# Patient Record
Sex: FEMALE | Race: WHITE | NOT HISPANIC OR LATINO | Employment: STUDENT | URBAN - METROPOLITAN AREA
[De-identification: names, ages, dates, MRNs, and addresses within clinical notes are randomized per-mention and may not be internally consistent; named-entity substitution may affect disease eponyms.]

---
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
## 2019-01-03 ENCOUNTER — OFFICE VISIT (OUTPATIENT)
Dept: DERMATOLOGY | Facility: CLINIC | Age: 19
End: 2019-01-03
Payer: COMMERCIAL

## 2019-01-03 DIAGNOSIS — L70.0 ACNE VULGARIS: Primary | ICD-10-CM

## 2019-01-03 PROCEDURE — 99999 PR PBB SHADOW E&M-EST. PATIENT-LVL II: ICD-10-PCS | Mod: PBBFAC,,, | Performed by: DERMATOLOGY

## 2019-01-03 PROCEDURE — 99202 OFFICE O/P NEW SF 15 MIN: CPT | Mod: S$GLB,,, | Performed by: DERMATOLOGY

## 2019-01-03 PROCEDURE — 99202 PR OFFICE/OUTPT VISIT, NEW, LEVL II, 15-29 MIN: ICD-10-PCS | Mod: S$GLB,,, | Performed by: DERMATOLOGY

## 2019-01-03 PROCEDURE — 99999 PR PBB SHADOW E&M-EST. PATIENT-LVL II: CPT | Mod: PBBFAC,,, | Performed by: DERMATOLOGY

## 2019-01-03 RX ORDER — ADAPALENE AND BENZOYL PEROXIDE 3; 25 MG/G; MG/G
GEL TOPICAL
Qty: 45 G | Refills: 3 | Status: SHIPPED | OUTPATIENT
Start: 2019-01-03 | End: 2020-02-18

## 2019-01-03 NOTE — PATIENT INSTRUCTIONS

## 2019-01-03 NOTE — PROGRESS NOTES
Subjective:       Patient ID:  Candace Brown is a 18 y.o. female who presents for   Chief Complaint   Patient presents with    Acne     face, chest, back, 8+ years, redness, Tx otc face wash     History of Present Illness: The patient presents with chief complaint of acne.  Location: face, chest, and back  Duration: 8 yrs.  Signs/Symptoms: redness, occ. painful cysts    Prior treatments: currently using Philosophy face wash bid, Neutrogena vit C moisturizer; had Rx sulfur-based wash around age 11          Review of Systems   HENT: Negative for nosebleeds and headaches.    Gastrointestinal: Negative for diarrhea and Sensitivity to oral antibiotics.   Genitourinary: Positive for irregular periods (mildly - not on ocp).   Musculoskeletal: Negative for arthralgias.   Skin: Positive for daily sunscreen use and activity-related sunscreen use. Negative for recent sunburn.        No hx of AD or sensitive skin   Neurological: Negative for headaches.   Psychiatric/Behavioral: Negative for depressed mood.   Hematologic/Lymphatic: Does not bruise/bleed easily.        Objective:    Physical Exam   Constitutional: She appears well-developed and well-nourished. No distress.   Neurological: She is alert and oriented to person, place, and time. She is not disoriented.   Psychiatric: She has a normal mood and affect.   Skin:   Areas Examined (abnormalities noted in diagram):   Head / Face Inspection Performed  Neck Inspection Performed  Chest / Axilla Inspection Performed  Back Inspection Performed  RUE Inspected  LUE Inspection Performed                   Diagram Legend     Erythematous scaling macule/papule c/w actinic keratosis       Vascular papule c/w angioma      Pigmented verrucoid papule/plaque c/w seborrheic keratosis      Yellow umbilicated papule c/w sebaceous hyperplasia      Irregularly shaped tan macule c/w lentigo     1-2 mm smooth white papules consistent with Milia      Movable subcutaneous cyst with punctum  c/w epidermal inclusion cyst      Subcutaneous movable cyst c/w pilar cyst      Firm pink to brown papule c/w dermatofibroma      Pedunculated fleshy papule(s) c/w skin tag(s)      Evenly pigmented macule c/w junctional nevus     Mildly variegated pigmented, slightly irregular-bordered macule c/w mildly atypical nevus      Flesh colored to evenly pigmented papule c/w intradermal nevus       Pink pearly papule/plaque c/w basal cell carcinoma      Erythematous hyperkeratotic cursted plaque c/w SCC      Surgical scar with no sign of skin cancer recurrence      Open and closed comedones      Inflammatory papules and pustules      Verrucoid papule consistent consistent with wart     Erythematous eczematous patches and plaques     Dystrophic onycholytic nail with subungual debris c/w onychomycosis     Umbilicated papule    Erythematous-base heme-crusted tan verrucoid plaque consistent with inflamed seborrheic keratosis     Erythematous Silvery Scaling Plaque c/w Psoriasis     See annotation      Assessment / Plan:        Acne vulgaris  -     EPIDUO FORTE 0.3-2.5 % GlwP; AAA face q other night - qhs as tolerated.  Dispense: 45 g; Refill: 3 - azeb    Recommend OTC Benzoyl Peroxide wash for chest and back daily.  Continue to wash face bid with non-medicated cleanser.  Pt will f/u with GYN to initiate OCP.         Follow-up if symptoms worsen or fail to improve, for with Derm Physician's Assistant (CVC).

## 2019-01-03 NOTE — LETTER
January 3, 2019      [facility]  [address] [address]           [facility] - Dermatology  [address]  [facility] [address]  Phone: [phone]  Fax: [phone]          Patient: Candace Brown   MR Number: [id number]   YOB: 2000   Date of Visit: 1/3/2019       Dear [facility]:    Thank you for referring Candace Brown to me for evaluation. Attached you will find relevant portions of my assessment and plan of care.    If you have questions, please do not hesitate to call me. I look forward to following Candace Brown along with you.    Sincerely,    Areli Estrada MD    Enclosure  CC:  No Recipients    If you would like to receive this communication electronically, please contact [email or web address] or [phone] to request more information on [employer] access.    For providers and/or their staff who would like to refer a patient to [facility], please contact us through our one-stop-shop provider referral line, Mariaa Galdamez, at [phone].    If you feel you have received this communication in error or would no longer like to receive these types of communications, please e-mail [email or web address]

## 2020-02-17 ENCOUNTER — OFFICE VISIT (OUTPATIENT)
Dept: INTERNAL MEDICINE | Facility: CLINIC | Age: 20
End: 2020-02-17
Payer: COMMERCIAL

## 2020-02-17 VITALS
DIASTOLIC BLOOD PRESSURE: 72 MMHG | HEIGHT: 67 IN | SYSTOLIC BLOOD PRESSURE: 126 MMHG | WEIGHT: 176.13 LBS | OXYGEN SATURATION: 99 % | BODY MASS INDEX: 27.64 KG/M2 | HEART RATE: 91 BPM

## 2020-02-17 DIAGNOSIS — Z00.00 ENCOUNTER FOR WELLNESS EXAMINATION: Primary | ICD-10-CM

## 2020-02-17 PROCEDURE — 99395 PREV VISIT EST AGE 18-39: CPT | Mod: S$GLB,,, | Performed by: STUDENT IN AN ORGANIZED HEALTH CARE EDUCATION/TRAINING PROGRAM

## 2020-02-17 PROCEDURE — 99999 PR PBB SHADOW E&M-EST. PATIENT-LVL III: CPT | Mod: PBBFAC,,, | Performed by: STUDENT IN AN ORGANIZED HEALTH CARE EDUCATION/TRAINING PROGRAM

## 2020-02-17 PROCEDURE — 99999 PR PBB SHADOW E&M-EST. PATIENT-LVL III: ICD-10-PCS | Mod: PBBFAC,,, | Performed by: STUDENT IN AN ORGANIZED HEALTH CARE EDUCATION/TRAINING PROGRAM

## 2020-02-17 PROCEDURE — 99395 PR PREVENTIVE VISIT,EST,18-39: ICD-10-PCS | Mod: S$GLB,,, | Performed by: STUDENT IN AN ORGANIZED HEALTH CARE EDUCATION/TRAINING PROGRAM

## 2020-02-17 NOTE — PROGRESS NOTES
Clinic Note  02/17/2020      Subjective:       Patient ID: Candace Brown is a 19 y.o. female being seen for a Return to School Letter  Chief Complaint: Letter for School/Work      Patient is an otherwise healthy 19 y.o. F. She is presenting to clinic today for Return to School Letter. Patient reports that she recently returned from [place] on 02/04/20 and has been quarantined at home where she was asymptomatic for the duration of her sequestration. She was living in [place] and visited [facility], [place], [facility], and [facility] while in [place]. Patient denies any travel to [facility]. She denies coming into contact with any patient with flu-like symptoms. She reports compliance with wearing a surgical mask after the outbreak in [place] and throughout the duration of her flight home. She denies any visits to markets with live animals and denies eating exotic animals such as bats. She denies any fevers, chills, congestion, cough, SOB, nausea, vomiting, diarrhea, or muscle aches.      Review of Systems   Constitutional: Negative for activity change, chills, fever and unexpected weight change.   HENT: Negative for ear pain, hearing loss, rhinorrhea, sore throat and trouble swallowing.    Eyes: Negative for photophobia, discharge and visual disturbance.   Respiratory: Negative for chest tightness, shortness of breath and wheezing.    Cardiovascular: Negative for chest pain and palpitations.   Gastrointestinal: Negative for blood in stool, constipation, diarrhea, nausea and vomiting.   Endocrine: Negative for polydipsia and polyuria.   Genitourinary: Negative for difficulty urinating, dysuria, hematuria, menstrual problem and urgency.   Musculoskeletal: Negative for arthralgias, back pain, joint swelling, myalgias and neck pain.   Skin: Negative for rash and wound.   Neurological: Positive for headaches. Negative for dizziness, syncope, weakness and light-headedness. Numbness: x1 day.   Psychiatric/Behavioral: Negative for  "agitation, behavioral problems, confusion and dysphoric mood.       Patient's Medications   New Prescriptions    No medications on file   Previous Medications    No medications on file   Modified Medications    No medications on file   Discontinued Medications    EPIDUO FORTE 0.3-2.5 % GLWP    AAA face q other night - qhs as tolerated.       Patient Active Problem List    Diagnosis Date Noted    Panic attacks     Anxiety attack 08/22/2016           Objective:      /72 (BP Location: Right arm, Patient Position: Sitting, BP Method: Large (Manual))   Pulse 91   Ht 5' 7" (1.702 m)   Wt 79.9 kg (176 lb 2.4 oz)   SpO2 99%   BMI 27.59 kg/m²   Estimated body mass index is 27.59 kg/m² as calculated from the following:    Height as of this encounter: 5' 7" (1.702 m).    Weight as of this encounter: 79.9 kg (176 lb 2.4 oz).    Physical Exam   Constitutional: She is oriented to person, place, and time. She appears well-developed and well-nourished.   HENT:   Head: Normocephalic and atraumatic.   Mouth/Throat: No oropharyngeal exudate.   Eyes: Conjunctivae and EOM are normal. No scleral icterus.   Neck: Normal range of motion. Neck supple.   Cardiovascular: Normal rate, regular rhythm, normal heart sounds and intact distal pulses.   Pulmonary/Chest: Effort normal and breath sounds normal.   Abdominal: Soft. Bowel sounds are normal. There is no tenderness.   Musculoskeletal: Normal range of motion.   Neurological: She is alert and oriented to person, place, and time.   Skin: Skin is warm and dry.   Psychiatric: She has a normal mood and affect. Her behavior is normal. Judgment and thought content normal.   Vitals reviewed.      Assessment and Plan:   Candace ROMAN was seen today for letter for school/work.    Diagnoses and all orders for this visit:    Encounter for wellness examination    There is no indication for further testing for Coronavirus as she has remained asymptomatic for two weeks per CDC guidelines. " Patient may return to school.     Patient seen and plan of care discussed with Dr. Dario Nash MD

## 2020-02-17 NOTE — LETTER
February 17, 2020      [facility]  [address]  [facility] [address]  Phone: [phone]  Fax: [phone]       Patient: Candace Brown   YOB: 2000  Date of Visit: 02/17/2020    To Whom It May Concern:    Jarvis Brown  was at [facility] on 02/17/2020. She may return to work/school on 02/18/2020 with no restrictions. She returned from [place] on 02/03/2020 and remained quarantined until 02/17/2020. She was asymptomatic throughout that duration. There is no indication for further testing for Coronavirus as she has remained asymptomatic for two weeks per CDC guidelines. If you have any questions or concerns, or if I can be of further assistance, please do not hesitate to contact me.    Sincerely,    Raven Nash MD/MS  [facility]   Internal Medicine

## 2020-02-18 NOTE — PROGRESS NOTES
I have reviewed and concur with Dr. Nash's history, physical, assessment, and plan.  I have personally interviewed and examined the patient at bedside.      Candace Brown is a 19 y.o. lady with no medical history who presents to [facility] for a return to school letter.  She has been in [place] for approximately 6 months where she was studying abroad.  She had spent most of her time in [place], but has also been in [facility] and [place].  During notification of the COVID-19 outbreak, she had noted that she had continued to wear a face mask.  Additionally, she denies coming into contact with anyone with flu-like symptoms.  She flew back around 2/3, where she left [place], landed in [place] [facility], and then into the  around 2/3 to 2/4.  During this time, she had remained indoors for self quarantine.  She has remained afebrile and has not developed any upper respiratory symptoms.  Given this information and per CDC recommendations, she does not qualify as a Person of Interest given lack of self reported clinical findings.  Additionally, she has completed 14 days of self quarantine, which the CDC recommends as the incubation period.  Given she has not developed symptoms during this time, it would be safe to assume per CDC recommendations that she would not be deemed transmissible.  She would thus be safe to return to school with the given information at this point.    Dale Bishop M.D.  Assistant Staff- Hospital Medicine  Chief Medical Resident - Internal Medicine   [phone]

## 2020-03-31 ENCOUNTER — TELEPHONE (OUTPATIENT)
Dept: DERMATOLOGY | Facility: CLINIC | Age: 20
End: 2020-03-31

## 2020-03-31 NOTE — TELEPHONE ENCOUNTER
----- Message from Ariella Ratliff sent at 3/31/2020  4:08 PM CDT -----  My [facility] Portal Appointment Request From: Candace Brown    With Provider: Any provider    Preferred Date Range: 4/1/2020 - 4/13/2020    Preferred Times: Any time    Reason for visit: Hives/ Red blotches over stomach and pelvic region    Comments:  Red blotches spreading over skin, moved from stomach down to pelvic region and up around breasts

## 2020-03-31 NOTE — TELEPHONE ENCOUNTER
Spoke with patient.  She reports that she went to urgent care and no longer needs an appointment with Derm.

## 2020-10-14 ENCOUNTER — OFFICE VISIT (OUTPATIENT)
Dept: ALLERGY | Facility: CLINIC | Age: 20
End: 2020-10-14
Payer: COMMERCIAL

## 2020-10-14 ENCOUNTER — LAB VISIT (OUTPATIENT)
Dept: LAB | Facility: HOSPITAL | Age: 20
End: 2020-10-14
Attending: ALLERGY & IMMUNOLOGY
Payer: COMMERCIAL

## 2020-10-14 VITALS
TEMPERATURE: 98 F | BODY MASS INDEX: 27.53 KG/M2 | DIASTOLIC BLOOD PRESSURE: 82 MMHG | WEIGHT: 171.31 LBS | HEART RATE: 89 BPM | SYSTOLIC BLOOD PRESSURE: 117 MMHG | HEIGHT: 66 IN

## 2020-10-14 DIAGNOSIS — Z91.018 FOOD ALLERGY: ICD-10-CM

## 2020-10-14 DIAGNOSIS — L50.9 URTICARIA: Primary | ICD-10-CM

## 2020-10-14 DIAGNOSIS — L50.9 URTICARIA: ICD-10-CM

## 2020-10-14 LAB
ALBUMIN SERPL BCP-MCNC: 4.9 G/DL (ref 3.5–5.2)
ALP SERPL-CCNC: 50 U/L (ref 55–135)
ALT SERPL W/O P-5'-P-CCNC: 12 U/L (ref 10–44)
ANION GAP SERPL CALC-SCNC: 11 MMOL/L (ref 8–16)
AST SERPL-CCNC: 14 U/L (ref 10–40)
BASOPHILS # BLD AUTO: 0.03 K/UL (ref 0–0.2)
BASOPHILS NFR BLD: 0.4 % (ref 0–1.9)
BILIRUB SERPL-MCNC: 0.6 MG/DL (ref 0.1–1)
BUN SERPL-MCNC: 7 MG/DL (ref 6–20)
CALCIUM SERPL-MCNC: 10.2 MG/DL (ref 8.7–10.5)
CHLORIDE SERPL-SCNC: 102 MMOL/L (ref 95–110)
CO2 SERPL-SCNC: 28 MMOL/L (ref 23–29)
CREAT SERPL-MCNC: 0.8 MG/DL (ref 0.5–1.4)
DIFFERENTIAL METHOD: ABNORMAL
EOSINOPHIL # BLD AUTO: 0.1 K/UL (ref 0–0.5)
EOSINOPHIL NFR BLD: 0.7 % (ref 0–8)
ERYTHROCYTE [DISTWIDTH] IN BLOOD BY AUTOMATED COUNT: 13.2 % (ref 11.5–14.5)
EST. GFR  (AFRICAN AMERICAN): >60 ML/MIN/1.73 M^2
EST. GFR  (NON AFRICAN AMERICAN): >60 ML/MIN/1.73 M^2
GLUCOSE SERPL-MCNC: 76 MG/DL (ref 70–110)
HCT VFR BLD AUTO: 45.7 % (ref 37–48.5)
HGB BLD-MCNC: 14.4 G/DL (ref 12–16)
IMM GRANULOCYTES # BLD AUTO: 0.01 K/UL (ref 0–0.04)
IMM GRANULOCYTES NFR BLD AUTO: 0.1 % (ref 0–0.5)
LYMPHOCYTES # BLD AUTO: 1.6 K/UL (ref 1–4.8)
LYMPHOCYTES NFR BLD: 24 % (ref 18–48)
MCH RBC QN AUTO: 29 PG (ref 27–31)
MCHC RBC AUTO-ENTMCNC: 31.5 G/DL (ref 32–36)
MCV RBC AUTO: 92 FL (ref 82–98)
MONOCYTES # BLD AUTO: 0.4 K/UL (ref 0.3–1)
MONOCYTES NFR BLD: 5.6 % (ref 4–15)
NEUTROPHILS # BLD AUTO: 4.7 K/UL (ref 1.8–7.7)
NEUTROPHILS NFR BLD: 69.2 % (ref 38–73)
NRBC BLD-RTO: 0 /100 WBC
PLATELET # BLD AUTO: 252 K/UL (ref 150–350)
PMV BLD AUTO: 10.4 FL (ref 9.2–12.9)
POTASSIUM SERPL-SCNC: 3.9 MMOL/L (ref 3.5–5.1)
PROT SERPL-MCNC: 7.5 G/DL (ref 6–8.4)
RBC # BLD AUTO: 4.96 M/UL (ref 4–5.4)
SODIUM SERPL-SCNC: 141 MMOL/L (ref 136–145)
WBC # BLD AUTO: 6.75 K/UL (ref 3.9–12.7)

## 2020-10-14 PROCEDURE — 84165 PATHOLOGIST INTERPRETATION SPE: ICD-10-PCS | Mod: 26,,, | Performed by: PATHOLOGY

## 2020-10-14 PROCEDURE — 36415 COLL VENOUS BLD VENIPUNCTURE: CPT

## 2020-10-14 PROCEDURE — 85025 COMPLETE CBC W/AUTO DIFF WBC: CPT

## 2020-10-14 PROCEDURE — 84443 ASSAY THYROID STIM HORMONE: CPT

## 2020-10-14 PROCEDURE — 99999 PR PBB SHADOW E&M-EST. PATIENT-LVL III: CPT | Mod: PBBFAC,,, | Performed by: ALLERGY & IMMUNOLOGY

## 2020-10-14 PROCEDURE — 86003 ALLG SPEC IGE CRUDE XTRC EA: CPT | Mod: 59

## 2020-10-14 PROCEDURE — 86705 HEP B CORE ANTIBODY IGM: CPT

## 2020-10-14 PROCEDURE — 99999 PR PBB SHADOW E&M-EST. PATIENT-LVL III: ICD-10-PCS | Mod: PBBFAC,,, | Performed by: ALLERGY & IMMUNOLOGY

## 2020-10-14 PROCEDURE — 83520 IMMUNOASSAY QUANT NOS NONAB: CPT

## 2020-10-14 PROCEDURE — 82785 ASSAY OF IGE: CPT

## 2020-10-14 PROCEDURE — 86038 ANTINUCLEAR ANTIBODIES: CPT

## 2020-10-14 PROCEDURE — 86003 ALLG SPEC IGE CRUDE XTRC EA: CPT

## 2020-10-14 PROCEDURE — 3008F BODY MASS INDEX DOCD: CPT | Mod: CPTII,S$GLB,, | Performed by: ALLERGY & IMMUNOLOGY

## 2020-10-14 PROCEDURE — 99204 PR OFFICE/OUTPT VISIT, NEW, LEVL IV, 45-59 MIN: ICD-10-PCS | Mod: S$GLB,,, | Performed by: ALLERGY & IMMUNOLOGY

## 2020-10-14 PROCEDURE — 3008F PR BODY MASS INDEX (BMI) DOCUMENTED: ICD-10-PCS | Mod: CPTII,S$GLB,, | Performed by: ALLERGY & IMMUNOLOGY

## 2020-10-14 PROCEDURE — 86592 SYPHILIS TEST NON-TREP QUAL: CPT

## 2020-10-14 PROCEDURE — 84165 PROTEIN E-PHORESIS SERUM: CPT | Mod: 26,,, | Performed by: PATHOLOGY

## 2020-10-14 PROCEDURE — 84165 PROTEIN E-PHORESIS SERUM: CPT

## 2020-10-14 PROCEDURE — 86803 HEPATITIS C AB TEST: CPT

## 2020-10-14 PROCEDURE — 86703 HIV-1/HIV-2 1 RESULT ANTBDY: CPT

## 2020-10-14 PROCEDURE — 80053 COMPREHEN METABOLIC PANEL: CPT

## 2020-10-14 PROCEDURE — 99204 OFFICE O/P NEW MOD 45 MIN: CPT | Mod: S$GLB,,, | Performed by: ALLERGY & IMMUNOLOGY

## 2020-10-14 RX ORDER — EPINEPHRINE 0.3 MG/.3ML
1 INJECTION SUBCUTANEOUS ONCE
Qty: 2 DEVICE | Refills: 3 | Status: SHIPPED | OUTPATIENT
Start: 2020-10-14 | End: 2020-10-14

## 2020-10-14 NOTE — PATIENT INSTRUCTIONS
Epinephrine injection (Auto-injector)  What is this medicine?  EPINEPHRINE (ep i ROSALBA rin) is used for the emergency treatment of severe allergic reactions. You should keep this medicine with you at all times.  How should I use this medicine?  This medicine is for injection into the outer thigh. Your doctor or health care professional will instruct you on the proper use of the device during an emergency. Read all directions carefully and make sure you understand them. Do not use more often than directed.  Talk to your pediatrician regarding the use of this medicine in children. Special care may be needed. This drug is commonly used in children. A special device is available for use in children. If you are giving this medicine to a young child, hold their leg firmly in place before and during the injection to prevent injury.  What side effects may I notice from receiving this medicine?  Side effects that you should report to your doctor or health care professional as soon as possible:  · allergic reactions like skin rash, itching or hives, swelling of the face, lips, or tongue  · breathing problems  · chest pain  · fast, irregular heartbeat  · pain, tingling, numbness in the hands or feet  · pain, redness, or irritation at site where injected  · vomiting  Side effects that usually do not require medical attention (report to your doctor or health care professional if they continue or are bothersome):  · anxious  · dizziness  · dry mouth  · headache  · increased sweating  · nausea  · unusually weak or tired  What may interact with this medicine?  This medicine is only used during an emergency. Significant drug interactions are not likely during emergency use.  What if I miss a dose?  This does not apply. You should only use this medicine for an allergic reaction.  Where should I keep my medicine?  Keep out of the reach of children.  Store at room temperature between 15 and 30 degrees C (59 and 86 degrees F). Protect  from light and heat. The solution should be clear in color. If the solution is discolored or contains particles it must be replaced. Throw away any unused medicine after the expiration date. Ask your doctor or pharmacist about proper disposal of the injector if it is  or has been used. Always replace your auto-injector before it expires.  What should I tell my health care provider before I take this medicine?  They need to know if you have any of the following conditions:  · diabetes  · heart disease  · high blood pressure  · lung or breathing disease, like asthma  · Parkinson's disease  · thyroid disease  · an unusual or allergic reaction to epinephrine, sulfites, other medicines, foods, dyes, or preservatives  · pregnant or trying to get pregnant  · breast-feeding  What should I watch for while using this medicine?  Keep this medicine ready for use in the case of a severe allergic reaction. Make sure that you have the phone number of your doctor or health care professional and local hospital ready. Remember to check the expiration date of your medicine regularly. You may need to have additional units of this medicine with you at work, school, or other places. Talk to your doctor or health care professional about your need for extra units. Some emergencies may require an additional dose. Check with your doctor or a health care professional before using an extra dose.  After use, go to the nearest hospital or call 911. Avoid physical activity. Make sure the treating health care professional knows you have received an injection of this medicine. You will receive additional instructions on what to do during and after use of this medicine before a medical emergency occurs.  NOTE:This sheet is a summary. It may not cover all possible information. If you have questions about this medicine, talk to your doctor, pharmacist, or health care provider. Copyright© 2017 [employer]

## 2020-10-14 NOTE — PROGRESS NOTES
Subjective:       Patient ID: Candace Brown is a 20 y.o. female.    Initial Visit    Chief Complaint:  Urticaria      HPI: 20 year old female complaining of hives for four days prior to starting medication that alleviated them.  She reports having hives April 2019- 5 days. The second episode of hives March 2020 and they lasted 8 days. She denies an association between her hives and the following: food, contact, water, pressure, change in internal temperature, the sun or exercise. 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  Lesions do not scar  Lesions do not last in one area longer than 24 hours  Lesions itch  She treated symptoms with methylprednisolone and hydroxyzine.  Triggers: unknown    Walnuts- tongue itchy that does not progress. She eats in spite of symptoms.  No reaction to almonds  Pecan- tongue itchy and she avoids  She denies tongue or throat swelling.  Peanut- no allergy      Past Medical History:   Diagnosis Date    Abnormal number of teeth     3 rows of teeth s/p surgical removal    Anxiety     panic disorder     Family History   Problem Relation Age of Onset    Hypothyroidism Mother     Diabetes Father     Hyperlipidemia Father     Asthma Maternal Grandmother     Hypertension Maternal Grandmother     Venous thrombosis Maternal Grandmother     Melanoma Neg Hx      No current outpatient medications on file prior to visit.     No current facility-administered medications on file prior to visit.      Review of patient's allergies indicates:   Allergen Reactions    Nuts [tree nut]      Walnuts-tongue itching       Environmental History: No pets. [employer] student  Review of Systems   Constitutional: Negative for chills and fever.   HENT: Negative for congestion and rhinorrhea.    Eyes: Negative for discharge and itching.   Respiratory: Negative for chest tightness, shortness of breath and wheezing.    Cardiovascular: Negative for chest pain and leg swelling.   Gastrointestinal: Negative  for nausea and vomiting.   Skin: Positive for rash. Negative for wound.   Allergic/Immunologic: Negative for environmental allergies and food allergies.   Hematological: Negative for adenopathy. Does not bruise/bleed easily.   Psychiatric/Behavioral: Negative for behavioral problems and suicidal ideas.        Objective:    Physical Exam  Vitals signs reviewed.   Constitutional:       General: She is not in acute distress.     Appearance: Normal appearance. She is well-developed. She is not ill-appearing, toxic-appearing or diaphoretic.   HENT:      Head: Normocephalic and atraumatic.      Right Ear: Tympanic membrane, ear canal and external ear normal. There is no impacted cerumen.      Left Ear: Tympanic membrane, ear canal and external ear normal. There is no impacted cerumen.      Nose: Nose normal. No congestion or rhinorrhea.      Mouth/Throat:      Mouth: Mucous membranes are moist.      Pharynx: Oropharynx is clear. No oropharyngeal exudate or posterior oropharyngeal erythema.   Eyes:      General: No scleral icterus.        Right eye: No discharge.         Left eye: No discharge.      Pupils: Pupils are equal, round, and reactive to light.   Neck:      Musculoskeletal: Normal range of motion and neck supple. No neck rigidity or muscular tenderness.      Thyroid: No thyromegaly.   Cardiovascular:      Rate and Rhythm: Normal rate and regular rhythm.      Heart sounds: Normal heart sounds. No murmur. No friction rub. No gallop.    Pulmonary:      Effort: Pulmonary effort is normal. No respiratory distress.      Breath sounds: Normal breath sounds. No stridor. No wheezing, rhonchi or rales.   Chest:      Chest wall: No tenderness.   Abdominal:      General: Bowel sounds are normal. There is no distension.      Palpations: Abdomen is soft. There is no mass.      Tenderness: There is no abdominal tenderness. There is no guarding or rebound.      Hernia: No hernia is present.   Musculoskeletal: Normal range of  motion.         General: No swelling, tenderness, deformity or signs of injury.      Right lower leg: No edema.      Left lower leg: No edema.   Lymphadenopathy:      Cervical: No cervical adenopathy.   Skin:     General: Skin is warm.      Coloration: Skin is not pale.      Findings: No erythema or rash.   Neurological:      General: No focal deficit present.      Mental Status: She is alert and oriented to person, place, and time.      Gait: Gait normal.   Psychiatric:         Mood and Affect: Mood normal.         Behavior: Behavior normal.         Thought Content: Thought content normal.         Judgment: Judgment normal.                 Assessment:       1. Urticaria    2. Food allergy         Plan:       Urticaria  -     CU (Chronic Urticaria) Index Panel; Future; Expected date: 10/14/2020  -     SHAWN Screen w/Reflex; Future; Expected date: 10/14/2020  -     CBC auto differential; Future; Expected date: 10/14/2020  -     Protein Electrophoresis, Serum; Future; Expected date: 10/14/2020  -     Stool Exam-Ova,Cysts,Parasites; Future; Expected date: 10/14/2020  -     Ascaris IgE; Future; Expected date: 10/14/2020  -     RPR; Future; Expected date: 10/14/2020  -     Comprehensive Metabolic Panel; Future; Expected date: 10/14/2020  -     HIV 1/2 Ag/Ab (4th Gen); Future; Expected date: 10/14/2020  -     Hepatitis C Antibody; Future; Expected date: 10/14/2020  -     Hepatitis B Core Antibody, IgM; Future; Expected date: 10/14/2020  -     IgE; Future; Expected date: 10/14/2020  -     Tryptase; Future; Expected date: 10/14/2020    Food allergy  -     Hamilton IgE; Future; Expected date: 10/14/2020  -     Pecan, nut; Future; Expected date: 10/14/2020    Avoid walnuts and pecans.  Epipen 2 pack  Discussed use, care and administration.  RTC 3 weeks or sooner, if needed.    BARI AMES

## 2020-10-15 ENCOUNTER — PATIENT MESSAGE (OUTPATIENT)
Dept: ALLERGY | Facility: CLINIC | Age: 20
End: 2020-10-15

## 2020-10-15 LAB
ALBUMIN SERPL ELPH-MCNC: 4.68 G/DL (ref 3.35–5.55)
ALPHA1 GLOB SERPL ELPH-MCNC: 0.23 G/DL (ref 0.17–0.41)
ALPHA2 GLOB SERPL ELPH-MCNC: 0.65 G/DL (ref 0.43–0.99)
ANA SER QL IF: NORMAL
B-GLOBULIN SERPL ELPH-MCNC: 0.67 G/DL (ref 0.5–1.1)
GAMMA GLOB SERPL ELPH-MCNC: 0.87 G/DL (ref 0.67–1.58)
HBV CORE IGM SERPL QL IA: NEGATIVE
HCV AB SERPL QL IA: NEGATIVE
HIV 1+2 AB+HIV1 P24 AG SERPL QL IA: NEGATIVE
IGE SERPL-ACNC: <35 IU/ML (ref 0–100)
PATHOLOGIST INTERPRETATION SPE: NORMAL
PROT SERPL-MCNC: 7.1 G/DL (ref 6–8.4)
RPR SER QL: NORMAL

## 2020-10-16 LAB — TRYPTASE LEVEL: 3.3 NG/ML

## 2020-10-19 LAB
DEPRECATED PECAN/HICK NUT IGE RAST QL: NORMAL
DEPRECATED WALNUT IGE RAST QL: NORMAL
PECAN/HICK NUT IGE QN: <0.1 KU/L
WALNUT IGE QN: <0.1 KU/L

## 2020-10-20 LAB
CU INDEX: 2.6
CU, ANTI-THYROGLOBULIN IGG: <20 IU/ML
CU, ANTI-THYROID PEROXIDASE IGG: <10 IU/ML
CU, TSH (THYROTROPIN): 2.1 UIU/ML (ref 0.4–4)

## 2020-10-21 LAB
A LUMBRIC IGE QN: <0.1 KU/L
DEPRECATED A LUMBRIC IGE RAST QL: 0

## 2020-10-23 ENCOUNTER — TELEPHONE (OUTPATIENT)
Dept: ALLERGY | Facility: CLINIC | Age: 20
End: 2020-10-23

## 2020-10-23 NOTE — PROGRESS NOTES
Left message for pt informing her that lab results will be sent to her portal and to call back if she has any questions.

## 2020-10-23 NOTE — TELEPHONE ENCOUNTER
----- Message from Teresa Alex sent at 10/23/2020 10:32 AM CDT -----  Contact: [phone]  Type:  Patient Returning Call    Who Called: patient   Who Left Message for Patient: nurse   Does the patient know what this is regarding?: results   Would the patient rather a call back or a response via [facility]?  Call back   Best Call Back Number [phone]  Additional Information:

## 2020-10-27 ENCOUNTER — PATIENT MESSAGE (OUTPATIENT)
Dept: ALLERGY | Facility: CLINIC | Age: 20
End: 2020-10-27

## 2020-11-04 ENCOUNTER — OFFICE VISIT (OUTPATIENT)
Dept: ALLERGY | Facility: CLINIC | Age: 20
End: 2020-11-04
Payer: COMMERCIAL

## 2020-11-04 VITALS
DIASTOLIC BLOOD PRESSURE: 82 MMHG | BODY MASS INDEX: 27.6 KG/M2 | TEMPERATURE: 98 F | HEIGHT: 66 IN | WEIGHT: 171.75 LBS | HEART RATE: 86 BPM | SYSTOLIC BLOOD PRESSURE: 127 MMHG

## 2020-11-04 DIAGNOSIS — K90.49 FOOD INTOLERANCE: ICD-10-CM

## 2020-11-04 DIAGNOSIS — L50.9 URTICARIA: Primary | ICD-10-CM

## 2020-11-04 PROCEDURE — 3008F BODY MASS INDEX DOCD: CPT | Mod: CPTII,S$GLB,, | Performed by: ALLERGY & IMMUNOLOGY

## 2020-11-04 PROCEDURE — 99214 PR OFFICE/OUTPT VISIT, EST, LEVL IV, 30-39 MIN: ICD-10-PCS | Mod: S$GLB,,, | Performed by: ALLERGY & IMMUNOLOGY

## 2020-11-04 PROCEDURE — 3008F PR BODY MASS INDEX (BMI) DOCUMENTED: ICD-10-PCS | Mod: CPTII,S$GLB,, | Performed by: ALLERGY & IMMUNOLOGY

## 2020-11-04 PROCEDURE — 99999 PR PBB SHADOW E&M-EST. PATIENT-LVL III: ICD-10-PCS | Mod: PBBFAC,,, | Performed by: ALLERGY & IMMUNOLOGY

## 2020-11-04 PROCEDURE — 1126F AMNT PAIN NOTED NONE PRSNT: CPT | Mod: S$GLB,,, | Performed by: ALLERGY & IMMUNOLOGY

## 2020-11-04 PROCEDURE — 99214 OFFICE O/P EST MOD 30 MIN: CPT | Mod: S$GLB,,, | Performed by: ALLERGY & IMMUNOLOGY

## 2020-11-04 PROCEDURE — 99999 PR PBB SHADOW E&M-EST. PATIENT-LVL III: CPT | Mod: PBBFAC,,, | Performed by: ALLERGY & IMMUNOLOGY

## 2020-11-04 PROCEDURE — 1126F PR PAIN SEVERITY QUANTIFIED, NO PAIN PRESENT: ICD-10-PCS | Mod: S$GLB,,, | Performed by: ALLERGY & IMMUNOLOGY

## 2020-11-04 NOTE — PROGRESS NOTES
Subjective:       Patient ID: Candace Brown is a 20 y.o. female.    Follow up visit from 10/14/2020      Chief Complaint:  Follow-up      HPI 10/14/2020: 20 year old female complaining of hives for four days prior to starting medication that alleviated them.  She reports having hives April 2019- 5 days. The second episode of hives March 2020 and they lasted 8 days. She denies an association between her hives and the following: food, contact, water, pressure, change in internal temperature, the sun or exercise. She is a student at \[employer]\"" in international studies.  Lesions do not scar  Lesions do not last in one area longer than 24 hours  Lesions itch  She treated symptoms with methylprednisolone and hydroxyzine.  Triggers: unknown    Walnuts- tongue itchy that does not progress. She eats in spite of symptoms.  No reaction to almonds  Pecan- tongue itchy and she avoids  She denies tongue or throat swelling.  Peanut- no allergy    HPI today: She is here today for follow up. She has not had hives and she is not taking antihistamines. She is also not avoiding any new foods. She feels great.        Past Medical History:   Diagnosis Date    Abnormal number of teeth     3 rows of teeth s/p surgical removal    Anxiety     panic disorder     Family History   Problem Relation Age of Onset    Hypothyroidism Mother     Diabetes Father     Hyperlipidemia Father     Asthma Maternal Grandmother     Hypertension Maternal Grandmother     Venous thrombosis Maternal Grandmother     Melanoma Neg Hx      Current Outpatient Medications on File Prior to Visit   Medication Sig Dispense Refill    EPINEPHrine (EPIPEN) 0.3 mg/0.3 mL AtIn Inject 0.3 mLs (0.3 mg total) into the muscle once. for 1 dose 2 Device 3     No current facility-administered medications on file prior to visit.      Review of patient's allergies indicates:   Allergen Reactions    Nuts [tree nut]      Walnuts-tongue itching       Environmental History: No pets.  [employer] student  Review of Systems   Constitutional: Negative for chills and fever.   HENT: Negative for congestion and rhinorrhea.    Eyes: Negative for discharge and itching.   Respiratory: Negative for chest tightness, shortness of breath and wheezing.    Cardiovascular: Negative for chest pain and leg swelling.   Gastrointestinal: Negative for nausea and vomiting.   Skin: Negative for rash and wound.   Allergic/Immunologic: Negative for environmental allergies and food allergies.   Hematological: Negative for adenopathy. Does not bruise/bleed easily.   Psychiatric/Behavioral: Negative for behavioral problems and suicidal ideas.        Objective:    Physical Exam  Vitals signs reviewed.   Constitutional:       General: She is not in acute distress.     Appearance: Normal appearance. She is well-developed. She is not ill-appearing, toxic-appearing or diaphoretic.   HENT:      Head: Normocephalic and atraumatic.      Right Ear: Tympanic membrane, ear canal and external ear normal. There is no impacted cerumen.      Left Ear: Tympanic membrane, ear canal and external ear normal. There is no impacted cerumen.      Nose: Nose normal. No congestion or rhinorrhea.      Mouth/Throat:      Mouth: Mucous membranes are moist.      Pharynx: Oropharynx is clear. No oropharyngeal exudate or posterior oropharyngeal erythema.   Eyes:      General: No scleral icterus.        Right eye: No discharge.         Left eye: No discharge.      Pupils: Pupils are equal, round, and reactive to light.   Neck:      Musculoskeletal: Normal range of motion and neck supple. No neck rigidity or muscular tenderness.      Thyroid: No thyromegaly.   Cardiovascular:      Rate and Rhythm: Normal rate and regular rhythm.      Heart sounds: Normal heart sounds. No murmur. No friction rub. No gallop.    Pulmonary:      Effort: Pulmonary effort is normal. No respiratory distress.      Breath sounds: Normal breath sounds. No stridor. No wheezing, rhonchi or  rales.   Chest:      Chest wall: No tenderness.   Abdominal:      General: Bowel sounds are normal. There is no distension.      Palpations: Abdomen is soft. There is no mass.      Tenderness: There is no abdominal tenderness. There is no guarding or rebound.      Hernia: No hernia is present.   Musculoskeletal: Normal range of motion.         General: No swelling, tenderness, deformity or signs of injury.      Right lower leg: No edema.      Left lower leg: No edema.   Lymphadenopathy:      Cervical: No cervical adenopathy.   Skin:     General: Skin is warm.      Coloration: Skin is not pale.      Findings: No erythema or rash.   Neurological:      General: No focal deficit present.      Mental Status: She is alert and oriented to person, place, and time.      Gait: Gait normal.   Psychiatric:         Mood and Affect: Mood normal.         Behavior: Behavior normal.         Thought Content: Thought content normal.         Judgment: Judgment normal.                 Assessment:       1. Urticaria    2. Food intolerance         Plan:       Urticaria    Food intolerance    Avoid  pecans.  Epipen 2 pack  Offered skin prick testing and an oral challenge to tree nuts.  Since Urticaria has resolved without medications, no intervention needed.  RTC 6 months or sooner, if needed.  BARI AMES